# Patient Record
Sex: FEMALE
[De-identification: names, ages, dates, MRNs, and addresses within clinical notes are randomized per-mention and may not be internally consistent; named-entity substitution may affect disease eponyms.]

---

## 2018-01-20 ENCOUNTER — HOSPITAL ENCOUNTER (EMERGENCY)
Dept: HOSPITAL 14 - H.ER | Age: 28
Discharge: HOME | End: 2018-01-20
Payer: COMMERCIAL

## 2018-01-20 VITALS
DIASTOLIC BLOOD PRESSURE: 77 MMHG | TEMPERATURE: 98.9 F | SYSTOLIC BLOOD PRESSURE: 113 MMHG | HEART RATE: 85 BPM | OXYGEN SATURATION: 98 % | RESPIRATION RATE: 18 BRPM
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
## 2018-01-20 DIAGNOSIS — S82.002A: Primary | ICD-10-CM
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
## 2018-01-20 PROCEDURE — 96372 THER/PROPH/DIAG INJ SC/IM: CPT

## 2018-01-20 PROCEDURE — 73562 X-RAY EXAM OF KNEE 3: CPT

## 2018-01-20 PROCEDURE — 99285 EMERGENCY DEPT VISIT HI MDM: CPT

## 2018-01-20 PROCEDURE — 29530 STRAPPING OF KNEE: CPT

## 2018-01-20 PROCEDURE — 73700 CT LOWER EXTREMITY W/O DYE: CPT

## 2018-01-20 PROCEDURE — 81025 URINE PREGNANCY TEST: CPT

## 2018-01-20 NOTE — RAD
PROCEDURE:  Left Knee Radiographs.



HISTORY:

Pain.



COMPARISON:

None.



FINDINGS:



BONES:

Likely chip or avulsion fracture medial segment of the medial tibial 

plateau with remainder the knee intact. No destructive bony lesion 

identified. 



JOINTS:

Normal. No osteoarthritis. 



JOINT EFFUSION:

None. 



OTHER FINDINGS:

None.



IMPRESSION:

Likely chip or avulsion fracture medial tibial plateau.  No 

subluxation, dislocation or other potential fracture identified.

## 2018-01-20 NOTE — ED PDOC
Lower Extremity Pain/Injury


Time Seen by Provider: 01/20/18 16:25


Chief Complaint (Nursing): Lower Extremity Problem/Injury


Chief Complaint (Provider): Knee pain


History Per: Patient


Additional Complaint(s): 





Pt is a 26 yo female, no PMH, presents to ED with complaints of left knee pain, 

pt [place] the dog park when two dogs collided into her and made her 

fall to the ground, pt states she heard a pop and cannot bear any weight, pain 

has worsened over the past hour.


No analgesics taken thus far. 





Past Medical History


Reviewed: Nursing Documentation, Vital Signs


Vital Signs: 





 Last Vital Signs











Temp  98.9 F   01/20/18 16:21


 


Pulse  85   01/20/18 16:21


 


Resp  18   01/20/18 16:21


 


BP  113/77   01/20/18 16:21


 


Pulse Ox  98   01/20/18 16:21














- Medical History


PMH: No Chronic Diseases





- Surgical History


Surgical History: No Surg Hx





- Family History


Family History: States: No Known Family Hx





- Living Arrangements


Living Arrangements: With Family





- Social History


Current smoker - smoking cessation education provided: No


Alcohol: Social


Drugs: Denies





- Allergies


Allergies/Adverse Reactions: 


 Allergies











Allergy/AdvReac Type Severity Reaction Status Date / Time


 


No Known Allergies Allergy   Verified 01/20/18 16:28














Review of Systems


ROS Statement: Except As Marked, All Systems Reviewed And Found Negative


Musculoskeletal: Positive for: Other (knee pain)





Physical Exam





- Reviewed


Nursing Documentation Reviewed: Yes


Vital Signs Reviewed: Yes





- Physical Exam


Appears: Positive for: Well, Non-toxic, No Acute Distress


Head Exam: Positive for: ATRAUMATIC, NORMAL INSPECTION, NORMOCEPHALIC


Skin: Positive for: Normal Color, Warm, DRY


Eye Exam: Positive for: EOMI, Normal appearance, PERRL


ENT: Positive for: Normal ENT Inspection


Neck: Positive for: Normal, Painless ROM


Cardiovascular/Chest: Positive for: Regular Rate, Rhythm


Respiratory: Positive for: CNT, Normal Breath Sounds


Gastrointestinal/Abdominal: Positive for: Normal Exam, Bowel Sounds, Soft


Back: Positive for: Normal Inspection


Extremity: Positive for: Tenderness (medial and lateral aspects of knee), Other 

(no ecchymosis, no effusion. limited extension due to pain).  Negative for: 

Deformity, Swelling


Neurologic/Psych: Positive for: Alert, Oriented





- ECG


O2 Sat by Pulse Oximetry: 98





Medical Decision Making


Medical Decision Making: 





Pt medicated with Motrin and Percocet PO





XR: (+) Avulsion fxr noted, as read by PA-C








CT obtained 


CT FINDINGS:


Bones/joints: Avulsion fracture medial aspect of proximal tibia. No 

dislocation. No significant joint


effusion.


Soft tissues: Mild stranding within anterior soft tissues.


IMPRESSION:


1. Avulsion fracture.





Pt placed in knee immobilizer and instructed in crutch walking by ED tech





Ortho referral made, importance of follow up stressed











Disposition





- Clinical Impression


Clinical Impression: 


 Knee fracture








- Patient ED Disposition


Is Patient to be Admitted: No





- Disposition


Disposition: Routine/Home


Disposition Time: 19:36


Condition: STABLE


Forms:  [facility] (English)

## 2018-01-21 NOTE — CT
PROCEDURE:  LEFT KNEE CT WITHOUT CONTRAST



HISTORY:

qustionable fxr noted on XR



COMPARISON:

Left knee radiographs also performed 04/01/2018.



TECHNIQUE:

A volumetric CT acquisition through the left knee was performed 

without intravenous contrast with reformatted datasets provided in 

sagittal axial and coronal planes as well surface rendered 

reformatted datasets. Intravenous contrast was not administered as 

per referring physician request. 



Radiation dose:



Total exam DLP = 327.27 mGy-cm.



This CT exam was performed using one or more of the following dose 

reduction techniques: Automated exposure control, adjustment of the 

mA and/or kV according to patient size, and/or use of iterative 

reconstruction technique.



FINDINGS:

A tiny chip or avulsion fracture related to the medial tibial plateau 

with remainder of the visualized tibia and fibula intact. The patella 

is intact. No subluxation or dislocation. Limited local soft tissue 

edema is seen medially related to the fracture noted above. No 

destructive bony lesion is identified. Popliteal fossa appears 

unremarkable no pattern to suggest suprapatellar bursitis and no 

prominent joint effusion is encountered. .



IMPRESSION:

A tiny chip or avulsion fracture is seen affecting the medial tibial 

plateau rim at its medial most extent. No additional fracture 

identified throughout. No subluxation or dislocation.



Concordant preliminary report from [facility], 01/20/2018.